# Patient Record
Sex: MALE | Race: WHITE
[De-identification: names, ages, dates, MRNs, and addresses within clinical notes are randomized per-mention and may not be internally consistent; named-entity substitution may affect disease eponyms.]

---

## 2021-01-11 ENCOUNTER — HOSPITAL ENCOUNTER (OUTPATIENT)
Dept: HOSPITAL 46 - DS | Age: 61
Discharge: HOME | End: 2021-01-11
Attending: RADIOLOGY
Payer: COMMERCIAL

## 2021-01-11 VITALS — WEIGHT: 191.8 LBS | BODY MASS INDEX: 27.46 KG/M2 | HEIGHT: 70 IN

## 2021-01-11 DIAGNOSIS — Z79.82: ICD-10-CM

## 2021-01-11 DIAGNOSIS — Z79.84: ICD-10-CM

## 2021-01-11 DIAGNOSIS — C61: Primary | ICD-10-CM

## 2021-01-11 DIAGNOSIS — E11.9: ICD-10-CM

## 2021-01-11 PROCEDURE — C2638 BRACHYTX, STRANDED, I-125: HCPCS

## 2021-01-11 NOTE — OR
[facility]
                                    [address]
                                  [place], [place]  [address]
_________________________________________________________________________________________
                                                                 Signed   
 
 
DATE OF OPERATION:
01/11/2021
 
SURGEON:
Lorri Vitale MD
 
PREOPERATIVE DIAGNOSIS:
Prostate cancer.
 
POSTOPERATIVE DIAGNOSIS:
1. Prostate cancer.
2. Normal cystourethroscopy.
 
NAMES OF PROCEDURES:
Diagnostic cystourethroscopy.
 
ANESTHESIA:
General.
 
ESTIMATED BLOOD LOSS:
None.
 
COMPLICATIONS:
None.
 
SPECIMENS:
None.
 
DRAINS:
None.
 
INDICATIONS FOR PROCEDURE:
Mr. Elena is a 60-year-old gentleman who is a patient of Dr. Cayden Nelson, who was
recently diagnosed with prostate cancer.  He elected to undergo brachytherapy for
definitive treatment of his gland confined disease.  He is already undergone brachy
therapy and he is now ready to undergo cystourethroscopy to rule out any potential
aberrant seed placement into the bladder or urethra. 
 
OPERATIVE FINDINGS:
Cystoscopy reveals no evidence of any suspicious masses, lesions, or stones.  Bilateral
ureteral orifices are in their normal anatomic location effluxing clear urine.  There is
no evidence of any obvious __________ present within the bladder or prostatic urethra.
 
    Electronically Signed By: LORRI VITALE MD  01/11/21 1349
_________________________________________________________________________________________
PATIENT NAME:     ROSE ELENA                     
MEDICAL RECORD #: [id number]            OPERATIVE REPORT              
          ACCT #: [id number]  
DATE OF BIRTH:   [birth year]            REPORT #: [id number]      
PHYSICIAN:        LORRI VITALE MD               
PCP:              HANSEL SANCHEZ MD           
REPORT IS CONFIDENTIAL AND NOT TO BE RELEASED WITHOUT AUTHORIZATION
 
 
                                  [facility]
                                    [address] [place]  [address]
_________________________________________________________________________________________
                                                                 Signed   
 
 
Retroflexion of the scope also reveals no evidence of any brachytherapy seeds present at
the bladder neck. 
 
DESCRIPTION OF PROCEDURE:
After informed consent was obtained, the patient was taken back to the operating room.
He was placed in the high dorsal lithotomy position and he underwent brachytherapy seed
placement with Dr. Cayden Nelson.  Please refer to his operative note for details regarding
that procedure.  Once the seed placement was complete, he was re-prepped and draped and
his indwelling Briggs catheter was removed.  Using a 30-degree lens on a 22.5 rigid
introducer, a rigid cystoscope was inserted through his urethra and into his bladder
under direct visualization.  Panendoscopic views of the bladder, bladder neck, prostatic
urethra and membranous bulbar and pendulous urethra was performed.  Please see the above
findings.  Overall, cystourethroscopy revealed no evidence of any aberrant seed
placement during brachytherapy.  I left approximately 100-200 mL within the patient's
bladder to make it easier for him to perform a voiding trial at the end of the
procedure.  The __________ was then slowly removed and the procedure was complete.  The
patient tolerated the procedure well, which went without any complication.  Since his
brachytherapy procedure is now complete, Anesthesia will now begin to awake him from
general anesthetic. 
 
 
 
            ________________________________________
            MD TAJ Coronado/ARIANE
Job #:  [id number]
DD:  01/11/2021 13:02:04
DT:  01/11/2021 13:31:12
 
 
Copies:                                
~
 
 
 
 
 
 
 
 
 
    Electronically Signed By: LORRI VITALE MD  01/11/21 1349
_________________________________________________________________________________________
PATIENT NAME:     ROSE ELENA                     
MEDICAL RECORD #: [id number]            OPERATIVE REPORT              
          ACCT #: [id number]  
DATE OF BIRTH:   [birth year]            REPORT #: [id number]      
PHYSICIAN:        LORRI VITALE MD               
PCP:              HANSEL SANCHEZ MD           
REPORT IS CONFIDENTIAL AND NOT TO BE RELEASED WITHOUT AUTHORIZATION

## 2021-01-11 NOTE — NUR
01/11/21 1307 Daphney Cuellar 1243 PT ARRIVED IN PACU NON RESPONSIVE TO NOXIOUS STIMULI WITH OPA
IN PLACE. 1301 PT REACTIVE. OPA REMOVED. 1305 PT REPOSITIONED SELF TO
L SIDE. NO C/O'S.

## 2021-01-11 NOTE — NUR
LE 1455: PT IS BLADDER SCANNED FOR APPROX 254ML OF URINE IN THE BLADDER. PT IS
ASSISTED UP OOB TO THE BATHROOM WHERE HE IS ABLE TO VOID 225ML OF YELLOW
URINE. PT REPORTS FEELLING LIKE HE HAS EMPTIED HIS BLADDER. THE PT'S BLADDER
IS SCANNED AGAIN FOR NO APPARENT RESIDUAL VOLUME. PT INDICATES THAT HE WOULD
LIKE TO GO HOME AT THIS TIME. HE IS EDUCATED ON HOW TO BEST DRESS HIMSELF AND
TO OPEN THE CURTAIN WHEN READY.
 
LE 1510: PT IS GIVEN VERBAL DC INSTRUCTIONS WITH  PRESENT. THEY BOTH
VERBALIZE UNDERSTANDING. QUESTIONS ARE ASKED AND ANSWERED. THE PT IS TAKEN TO
CAR VIA , HE IS ABLE TO TRANSFER HIMSELF FROM WC TO CAR.

## 2021-01-11 NOTE — NUR
LE 1320: PT ARRIVES TO  FROM PACU. HE IS SLIGHTLY DROWSY. HE IS GIVEN SUGAR
FREE PUDDING AND WATER. CALL LIGHT WITHIN REACH.  AT THE BEDSIDE. NO
ADDITIONAL NEEDS AT THIS TIME.

## 2021-01-12 NOTE — OR
[facility]
                                    [address]
                                  [place], [place]  [address]
_________________________________________________________________________________________
                                                                 Signed   
 
 
DATE OF OPERATION:
01/11/2021
 
SURGEON:
Dante Nelson MD, PH.D.
 
PREOPERATIVE DIAGNOSIS:
Prostate adenocarcinoma.
 
POSTOPERATIVE DIAGNOSIS:
Prostate adenocarcinoma.
 
PROCEDURE:
Rectal ultrasound-guided implantation of radioactive iodine-125 seeds into the prostate
gland. 
 
ANESTHESIA:
General.
 
ESTIMATED BLOOD LOSS:
Minimal.
 
COMPLICATIONS:
None.
 
ANTIBIOTICS:
IV levofloxacin.
 
INDICATIONS FOR PROCEDURE:
Jesús Bear is a 60-year-old gentleman, who was recently diagnosed with a
stage IIa, Mer grade 3+4=7, PSA 5.8 prostate adenocarcinoma.  He has elected to
receive a radioactive seed implant as monotherapy. 
 
Prescription dose:  145 Gy with iodine-125 seeds with 0.336 millicurie seeds.
 
DESCRIPTION OF OPERATIVE PROCEDURE:
Following induction of adequate anesthesia, the patient was placed in the treatment
planning dorsal lithotomy position.  The perineum was prepped with Betadine and a Briggs
catheter was inserted into the bladder without difficulty.  The scrotum was elevated
onto the abdominal wall and secured with Ioban.  The fixation support system was fixed
to the table and the ultrasound probe was affixed to the system.  Transrectal ultrasound
examination of the prostate was performed with a 6 MHz probe, taking sagittal and
 
    Electronically Signed By: DANTE NELSON  01/12/21 1846
_________________________________________________________________________________________
PATIENT NAME:     JESÚS DUNBAR                     
MEDICAL RECORD #: [id number]            OPERATIVE REPORT              
          ACCT #: [id number]  
DATE OF BIRTH:   [birth year]            REPORT #: [id number]      
PHYSICIAN:        DANTE NELSON                     
PCP:              HANSEL SANCHEZ MD           
REPORT IS CONFIDENTIAL AND NOT TO BE RELEASED WITHOUT AUTHORIZATION
 
 
                                  [facility]
                                    [address] [place], [place]  [address]
_________________________________________________________________________________________
                                                                 Signed   
 
 
transverse views to localize the prostate gland and to make sure the images conform to
the preoperative plan.  Once the proper angulation and position were obtained, the
apparatus was fixed in position.  The apparatus was then draped and the template was
attached.
 
Then, individual preloaded needles were inserted, one needle at a time through the
template and perineal skin, and into the prostate gland according to the preoperative
plan.  Each individual needle was identified on the ultrasound images and inserted into
position as close as possible to the pretreatment plan on axial images.  Two anchor
needles were placed initially to help stabilize the prostate gland.  Then, starting
anteriorly, one row of needles were placed first.  The proper depth of each needle was
then confirmed on sagittal images and also by measuring the distance from the template
to the hub of each needle with a ruler.  Then, needles were slowly withdrawn with the
stylet in place, so that the stranded seeds were placed in the prostate gland in the
proper position.  Then, subsequently, rows of needles were placed, one at a time with
placement of all the seeds from each row before proceeding to the next row.  AP pelvic
x-rays were taken periodically to confirm the proper position of the radioactive seeds.
The seeds from the anchor needles were deployed close to the end of the case, so that
the needles could provide stabilization. 
 
The treatment plan called for 82 seeds to be implanted, and a total of 82 seeds were
placed into the prostate gland utilizing 22 needles for a total activity of 27.570
millicuries.  There was no bony interference encountered during the procedure. 
 
Following the insertion of the seeds, an AP pelvic x-ray was taken, which revealed seeds
to be in the proper position within the pelvis.  The patient then had a cystoscopy
performed by Dr. Vitale (see separate operative report) with no evidence of needle
trauma or any seeds seen in the urethra or bladder.
 
The patient tolerated the procedure well and was taken to the recovery room in good
condition. He will have a voiding trial prior to discharge.
 
The patient will follow up with me in 1 month and will also have a CT scan of the pelvis
performed for  of his seed implant.  He was told to phone our office if
he has any difficulties or problems. 
 
CONDITION:
Stable.
 
 
 
            ________________________________________
 
    Electronically Signed By: DANTE NELSON  01/12/21 1846
_________________________________________________________________________________________
PATIENT NAME:     JESÚS DUNBAR                     
MEDICAL RECORD #: [id number]            OPERATIVE REPORT              
          ACCT #: [id number]  
DATE OF BIRTH:   [birth year]            REPORT #: [id number]      
PHYSICIAN:        DANTE NELSON                     
PCP:              HANSEL SANCHEZ MD           
REPORT IS CONFIDENTIAL AND NOT TO BE RELEASED WITHOUT AUTHORIZATION
 
 
                                  [facility]
                                    [address] [place] [address]  [address]
_________________________________________________________________________________________
                                                                 Signed   
 
 
            Dante Nelson MD, PH.D. 
 
 
JOE/MODL
Job #:  [id number]
DD:  01/11/2021 17:58:15
DT:  01/12/2021 00:11:36
 
cc:            MD Luis Alfredo Coronado MD Robert G Johnson, MD
 
 
Copies:  LORRI VITALE MD, JOHN MD JOHNSON, ROBERT D DMD
~
 
 
 
 
 
 
 
 
 
 
 
 
 
 
 
 
 
 
 
 
 
 
 
 
    Electronically Signed By: DANTE NELSON  01/12/21 1846
_________________________________________________________________________________________
PATIENT NAME:     JESÚS DUNBAR                     
MEDICAL RECORD #: [id number]            OPERATIVE REPORT              
          ACCT #: [id number]  
DATE OF BIRTH:   [birth year]            REPORT #: [id number]      
PHYSICIAN:        DANTE NELSON                     
PCP:              HANSEL SANCHEZ MD           
REPORT IS CONFIDENTIAL AND NOT TO BE RELEASED WITHOUT AUTHORIZATION